# Patient Record
Sex: MALE | Race: ASIAN
[De-identification: names, ages, dates, MRNs, and addresses within clinical notes are randomized per-mention and may not be internally consistent; named-entity substitution may affect disease eponyms.]

---

## 2020-09-24 ENCOUNTER — HOSPITAL ENCOUNTER (EMERGENCY)
Dept: HOSPITAL 54 - ER | Age: 55
LOS: 1 days | Discharge: HOME | End: 2020-09-25
Payer: COMMERCIAL

## 2020-09-24 VITALS — WEIGHT: 223 LBS | HEIGHT: 70 IN | BODY MASS INDEX: 31.92 KG/M2

## 2020-09-24 DIAGNOSIS — E11.9: ICD-10-CM

## 2020-09-24 DIAGNOSIS — R51: ICD-10-CM

## 2020-09-24 DIAGNOSIS — Z91.14: ICD-10-CM

## 2020-09-24 DIAGNOSIS — J45.909: ICD-10-CM

## 2020-09-24 DIAGNOSIS — I16.0: Primary | ICD-10-CM

## 2020-09-24 LAB
ALBUMIN SERPL BCP-MCNC: 3.4 G/DL (ref 3.4–5)
ALP SERPL-CCNC: 99 U/L (ref 46–116)
ALT SERPL W P-5'-P-CCNC: 78 U/L (ref 12–78)
AST SERPL W P-5'-P-CCNC: 40 U/L (ref 15–37)
BASOPHILS # BLD AUTO: 0.1 /CMM (ref 0–0.2)
BASOPHILS NFR BLD AUTO: 0.8 % (ref 0–2)
BILIRUB DIRECT SERPL-MCNC: 0.1 MG/DL (ref 0–0.2)
BILIRUB SERPL-MCNC: 0.5 MG/DL (ref 0.2–1)
BUN SERPL-MCNC: 18 MG/DL (ref 7–18)
CALCIUM SERPL-MCNC: 8.6 MG/DL (ref 8.5–10.1)
CHLORIDE SERPL-SCNC: 103 MMOL/L (ref 98–107)
CO2 SERPL-SCNC: 23 MMOL/L (ref 21–32)
CREAT SERPL-MCNC: 1.2 MG/DL (ref 0.6–1.3)
EOSINOPHIL NFR BLD AUTO: 4.7 % (ref 0–6)
GLUCOSE SERPL-MCNC: 244 MG/DL (ref 74–106)
HCT VFR BLD AUTO: 47 % (ref 39–51)
HGB BLD-MCNC: 16 G/DL (ref 13.5–17.5)
LYMPHOCYTES NFR BLD AUTO: 2.4 /CMM (ref 0.8–4.8)
LYMPHOCYTES NFR BLD AUTO: 23.7 % (ref 20–44)
MCHC RBC AUTO-ENTMCNC: 34 G/DL (ref 31–36)
MCV RBC AUTO: 96 FL (ref 80–96)
MONOCYTES NFR BLD AUTO: 0.8 /CMM (ref 0.1–1.3)
MONOCYTES NFR BLD AUTO: 7.9 % (ref 2–12)
NEUTROPHILS # BLD AUTO: 6.3 /CMM (ref 1.8–8.9)
NEUTROPHILS NFR BLD AUTO: 62.9 % (ref 43–81)
PLATELET # BLD AUTO: 233 /CMM (ref 150–450)
POTASSIUM SERPL-SCNC: 4.5 MMOL/L (ref 3.5–5.1)
PROT SERPL-MCNC: 7.4 G/DL (ref 6.4–8.2)
RBC # BLD AUTO: 4.97 MIL/UL (ref 4.5–6)
SODIUM SERPL-SCNC: 137 MMOL/L (ref 136–145)
WBC NRBC COR # BLD AUTO: 10 K/UL (ref 4.3–11)

## 2020-09-24 NOTE — NUR
PT CAME TO THE ED C/O HEADACHE X 1 DAY AND ELEVATED BLOOD PRESSURE. PT STATES 
HE HAD BEEN NON COMPLIANT W/ BLOOD PRESSURE MEDICATIONS PRESCRIBED BY PMD. PT 
[id number], RESPIRATIONS EVEN AND UNLABORED ON RA W/ NAD NOTED. PT CONNECTED TO THE 
CARDIAC MONITOR AND POX

## 2020-09-25 VITALS — DIASTOLIC BLOOD PRESSURE: 84 MMHG | SYSTOLIC BLOOD PRESSURE: 143 MMHG
